# Patient Record
Sex: FEMALE | Race: WHITE | NOT HISPANIC OR LATINO
[De-identification: names, ages, dates, MRNs, and addresses within clinical notes are randomized per-mention and may not be internally consistent; named-entity substitution may affect disease eponyms.]

---

## 2017-01-30 ENCOUNTER — RX RENEWAL (OUTPATIENT)
Age: 27
End: 2017-01-30

## 2017-02-06 ENCOUNTER — APPOINTMENT (OUTPATIENT)
Dept: NEUROLOGY | Facility: CLINIC | Age: 27
End: 2017-02-06

## 2017-02-06 VITALS
DIASTOLIC BLOOD PRESSURE: 68 MMHG | SYSTOLIC BLOOD PRESSURE: 110 MMHG | HEIGHT: 64 IN | BODY MASS INDEX: 18.95 KG/M2 | WEIGHT: 111 LBS | HEART RATE: 78 BPM

## 2017-05-12 ENCOUNTER — APPOINTMENT (OUTPATIENT)
Dept: DERMATOLOGY | Facility: CLINIC | Age: 27
End: 2017-05-12

## 2017-05-12 VITALS — BODY MASS INDEX: 17.93 KG/M2 | WEIGHT: 105 LBS | HEIGHT: 64 IN

## 2017-05-12 RX ORDER — OFLOXACIN OTIC 3 MG/ML
0.3 SOLUTION AURICULAR (OTIC)
Qty: 10 | Refills: 0 | Status: DISCONTINUED | COMMUNITY
Start: 2017-04-19

## 2017-05-12 RX ORDER — AMOXICILLIN 875 MG/1
875 TABLET, FILM COATED ORAL
Qty: 20 | Refills: 0 | Status: DISCONTINUED | COMMUNITY
Start: 2017-04-19

## 2017-05-12 RX ORDER — ALBUTEROL SULFATE 90 UG/1
108 (90 BASE) AEROSOL, METERED RESPIRATORY (INHALATION)
Qty: 18 | Refills: 0 | Status: ACTIVE | COMMUNITY
Start: 2017-04-03

## 2017-06-05 ENCOUNTER — APPOINTMENT (OUTPATIENT)
Dept: NEUROLOGY | Facility: CLINIC | Age: 27
End: 2017-06-05

## 2017-06-05 VITALS
BODY MASS INDEX: 17.93 KG/M2 | DIASTOLIC BLOOD PRESSURE: 77 MMHG | SYSTOLIC BLOOD PRESSURE: 135 MMHG | HEART RATE: 75 BPM | HEIGHT: 64 IN | WEIGHT: 105 LBS

## 2017-07-19 ENCOUNTER — RX RENEWAL (OUTPATIENT)
Age: 27
End: 2017-07-19

## 2017-08-25 ENCOUNTER — APPOINTMENT (OUTPATIENT)
Dept: DERMATOLOGY | Facility: CLINIC | Age: 27
End: 2017-08-25
Payer: MEDICAID

## 2017-08-25 VITALS — WEIGHT: 105 LBS | HEIGHT: 64 IN | BODY MASS INDEX: 17.93 KG/M2

## 2017-08-25 PROCEDURE — 99213 OFFICE O/P EST LOW 20 MIN: CPT

## 2017-12-05 ENCOUNTER — APPOINTMENT (OUTPATIENT)
Dept: NEUROLOGY | Facility: CLINIC | Age: 27
End: 2017-12-05
Payer: MEDICAID

## 2017-12-05 VITALS
HEIGHT: 64 IN | SYSTOLIC BLOOD PRESSURE: 126 MMHG | DIASTOLIC BLOOD PRESSURE: 83 MMHG | BODY MASS INDEX: 18.1 KG/M2 | HEART RATE: 79 BPM | WEIGHT: 106 LBS

## 2017-12-05 PROCEDURE — 99214 OFFICE O/P EST MOD 30 MIN: CPT

## 2018-01-23 ENCOUNTER — RX RENEWAL (OUTPATIENT)
Age: 28
End: 2018-01-23

## 2018-02-13 ENCOUNTER — RX RENEWAL (OUTPATIENT)
Age: 28
End: 2018-02-13

## 2018-02-22 ENCOUNTER — RX RENEWAL (OUTPATIENT)
Age: 28
End: 2018-02-22

## 2018-03-16 ENCOUNTER — APPOINTMENT (OUTPATIENT)
Dept: DERMATOLOGY | Facility: CLINIC | Age: 28
End: 2018-03-16

## 2018-03-23 ENCOUNTER — RX RENEWAL (OUTPATIENT)
Age: 28
End: 2018-03-23

## 2018-05-17 ENCOUNTER — RX RENEWAL (OUTPATIENT)
Age: 28
End: 2018-05-17

## 2018-05-29 ENCOUNTER — APPOINTMENT (OUTPATIENT)
Dept: NEUROLOGY | Facility: CLINIC | Age: 28
End: 2018-05-29
Payer: MEDICAID

## 2018-05-29 VITALS
WEIGHT: 118 LBS | DIASTOLIC BLOOD PRESSURE: 84 MMHG | BODY MASS INDEX: 20.14 KG/M2 | HEIGHT: 64 IN | SYSTOLIC BLOOD PRESSURE: 119 MMHG | HEART RATE: 70 BPM

## 2018-05-29 PROCEDURE — 99214 OFFICE O/P EST MOD 30 MIN: CPT

## 2018-06-15 ENCOUNTER — APPOINTMENT (OUTPATIENT)
Dept: DERMATOLOGY | Facility: CLINIC | Age: 28
End: 2018-06-15

## 2018-06-18 ENCOUNTER — RX RENEWAL (OUTPATIENT)
Age: 28
End: 2018-06-18

## 2018-06-25 ENCOUNTER — RX RENEWAL (OUTPATIENT)
Age: 28
End: 2018-06-25

## 2018-06-28 ENCOUNTER — APPOINTMENT (OUTPATIENT)
Dept: FAMILY MEDICINE | Facility: CLINIC | Age: 28
End: 2018-06-28
Payer: MEDICAID

## 2018-06-28 VITALS
HEIGHT: 64 IN | DIASTOLIC BLOOD PRESSURE: 70 MMHG | RESPIRATION RATE: 12 BRPM | HEART RATE: 70 BPM | BODY MASS INDEX: 19.97 KG/M2 | SYSTOLIC BLOOD PRESSURE: 110 MMHG | WEIGHT: 117 LBS | OXYGEN SATURATION: 97 %

## 2018-06-28 DIAGNOSIS — G40.909 EPILEPSY, UNSPECIFIED, NOT INTRACTABLE, W/OUT STATUS EPILEPTICUS: ICD-10-CM

## 2018-06-28 DIAGNOSIS — H57.8 OTHER SPECIFIED DISORDERS OF EYE AND ADNEXA: ICD-10-CM

## 2018-06-28 DIAGNOSIS — Z80.0 FAMILY HISTORY OF MALIGNANT NEOPLASM OF DIGESTIVE ORGANS: ICD-10-CM

## 2018-06-28 DIAGNOSIS — M79.1 MYALGIA: ICD-10-CM

## 2018-06-28 DIAGNOSIS — Z83.518 FAMILY HISTORY OF OTHER SPECIFIED EYE DISORDER: ICD-10-CM

## 2018-06-28 DIAGNOSIS — J45.909 UNSPECIFIED ASTHMA, UNCOMPLICATED: ICD-10-CM

## 2018-06-28 DIAGNOSIS — Z91.89 OTHER SPECIFIED PERSONAL RISK FACTORS, NOT ELSEWHERE CLASSIFIED: ICD-10-CM

## 2018-06-28 DIAGNOSIS — Z83.3 FAMILY HISTORY OF DIABETES MELLITUS: ICD-10-CM

## 2018-06-28 DIAGNOSIS — F17.200 NICOTINE DEPENDENCE, UNSPECIFIED, UNCOMPLICATED: ICD-10-CM

## 2018-06-28 DIAGNOSIS — Z87.898 PERSONAL HISTORY OF OTHER SPECIFIED CONDITIONS: ICD-10-CM

## 2018-06-28 DIAGNOSIS — F41.9 ANXIETY DISORDER, UNSPECIFIED: ICD-10-CM

## 2018-06-28 DIAGNOSIS — Z87.448 PERSONAL HISTORY OF OTHER DISEASES OF URINARY SYSTEM: ICD-10-CM

## 2018-06-28 DIAGNOSIS — G89.4 CHRONIC PAIN SYNDROME: ICD-10-CM

## 2018-06-28 DIAGNOSIS — Z76.89 PERSONS ENCOUNTERING HEALTH SERVICES IN OTHER SPECIFIED CIRCUMSTANCES: ICD-10-CM

## 2018-06-28 DIAGNOSIS — Z91.09 OTHER ALLERGY STATUS, OTHER THAN TO DRUGS AND BIOLOGICAL SUBSTANCES: ICD-10-CM

## 2018-06-28 DIAGNOSIS — F32.9 MAJOR DEPRESSIVE DISORDER, SINGLE EPISODE, UNSPECIFIED: ICD-10-CM

## 2018-06-28 DIAGNOSIS — Z87.442 PERSONAL HISTORY OF URINARY CALCULI: ICD-10-CM

## 2018-06-28 LAB — CYTOLOGY CVX/VAG DOC THIN PREP: NORMAL

## 2018-06-28 PROCEDURE — 99406 BEHAV CHNG SMOKING 3-10 MIN: CPT

## 2018-06-28 PROCEDURE — G0444 DEPRESSION SCREEN ANNUAL: CPT

## 2018-06-28 PROCEDURE — 99204 OFFICE O/P NEW MOD 45 MIN: CPT | Mod: 25

## 2018-06-28 RX ORDER — TRETINOIN 0.5 MG/G
0.05 CREAM TOPICAL DAILY
Qty: 1 | Refills: 3 | Status: DISCONTINUED | COMMUNITY
Start: 2017-05-12 | End: 2018-06-28

## 2018-06-28 RX ORDER — ALBUTEROL SULFATE 2.5 MG/3ML
(2.5 MG/3ML) SOLUTION RESPIRATORY (INHALATION)
Qty: 75 | Refills: 0 | Status: COMPLETED | COMMUNITY
Start: 2017-04-19 | End: 2018-06-28

## 2018-06-28 NOTE — HEALTH RISK ASSESSMENT
[Good] : ~his/her~ current health as good [Fair] :  ~his/her~ mood as fair [] : Yes [No falls in past year] : Patient reported no falls in the past year [2] : 1) Little interest or pleasure doing things for more than half of the days (2) [1] : 2) Feeling down, depressed, or hopeless for several days (1) [Patient reported PAP Smear was normal] : Patient reported PAP Smear was normal [None] : None [With Family] : lives with family [Employed] : employed [High School] : high school [Significant Other] : lives with significant other [Sexually Active] : sexually active [Feels Safe at Home] : Feels safe at home [Fully functional (bathing, dressing, toileting, transferring, walking, feeding)] : Fully functional (bathing, dressing, toileting, transferring, walking, feeding) [Fully functional (using the telephone, shopping, preparing meals, housekeeping, doing laundry, using] : Fully functional and needs no help or supervision to perform IADLs (using the telephone, shopping, preparing meals, housekeeping, doing laundry, using transportation, managing medications and managing finances) [Smoke Detector] : smoke detector [Carbon Monoxide Detector] : carbon monoxide detector [Seat Belt] :  uses seat belt [Patient declined discussion] : Patient declined discussion [de-identified] : 1ppd - 14 years  [de-identified] : occasional [de-identified] : marijuana  [[id number]] : 3 [[id number]] : 13 [Change in mental status noted] : No change in mental status noted [Language] : denies difficulty with language [Behavior] : denies difficulty with behavior [Reasoning] : denies difficulty with reasoning [Reports changes in hearing] : Reports no changes in hearing [Reports changes in vision] : Reports no changes in vision [Reports changes in dental health] : Reports no changes in dental health [Sunscreen] : does not use sunscreen [PapSmearDate] : 4/2017 [FreeTextEntry2] : part time job [de-identified] : does not use condoms, 1 partner [de-identified] : glasses

## 2018-06-28 NOTE — PHYSICAL EXAM
[No Acute Distress] : no acute distress [Well Nourished] : well nourished [Well Developed] : well developed [Well-Appearing] : well-appearing [PERRL] : pupils equal round and reactive to light [EOMI] : extraocular movements intact [Normal Outer Ear/Nose] : the outer ears and nose were normal in appearance [Normal Oropharynx] : the oropharynx was normal [Normal TMs] : both tympanic membranes were normal [Supple] : supple [No Lymphadenopathy] : no lymphadenopathy [Thyroid Normal, No Nodules] : the thyroid was normal and there were no nodules present [No Respiratory Distress] : no respiratory distress  [Clear to Auscultation] : lungs were clear to auscultation bilaterally [No Accessory Muscle Use] : no accessory muscle use [Normal Rate] : normal rate  [Regular Rhythm] : with a regular rhythm [Normal S1, S2] : normal S1 and S2 [No Murmur] : no murmur heard [Pedal Pulses Present] : the pedal pulses are present [No Edema] : there was no peripheral edema [Soft] : abdomen soft [Non Tender] : non-tender [Non-distended] : non-distended [No Masses] : no abdominal mass palpated [Normal Bowel Sounds] : normal bowel sounds [Normal Posterior Cervical Nodes] : no posterior cervical lymphadenopathy [Normal Anterior Cervical Nodes] : no anterior cervical lymphadenopathy [No CVA Tenderness] : no CVA  tenderness [No Spinal Tenderness] : no spinal tenderness [No Joint Swelling] : no joint swelling [Grossly Normal Strength/Tone] : grossly normal strength/tone [No Rash] : no rash [Normal Gait] : normal gait [Coordination Grossly Intact] : coordination grossly intact [No Focal Deficits] : no focal deficits [Normal Affect] : the affect was normal [Alert and Oriented x3] : oriented to person, place, and time [Normal Insight/Judgement] : insight and judgment were intact [de-identified] : eye redness bilaterally, no crusting or discharge

## 2018-06-28 NOTE — COUNSELING
[Healthy eating counseling provided] : healthy eating [Activity counseling provided] : activity [Smoking cessation counseling provided] : smoking cessation [Quit Smoking] : Quit smoking [Needs reinforcement, provided] : Patient needs reinforcement on understanding lifestyle changes and  the steps needed to achieve self management goals and reinforcement was provided [Tobacco Use Cessation Intermediate Greater Than 3 Minutes Up to 10 Minutes] : Tobacco Use Cessation Intermediate Greater Than 3 Minutes Up to 10 Minutes [ - Annual Depression Screening] : Annual Depression Screening

## 2018-06-28 NOTE — ASSESSMENT
[FreeTextEntry1] : Establish care\par - patient will schedule a physical exam and blood work to be done\par - will obtain records from previous PCP\par - due for pap - info for GYN given\par - declines tetanus vaccine\par - discussed the importance of skin care and sunscreen use, discouraged patient from using tanning beds - follow up with derm for skin check\par \par Anxiety/depression\par - positive screening - PHQ 9 score 13\par - currently on zoloft and xanax\par - has never seen psychiatry before\par - recommend follow up with psychiatry to improve medication management\par \par Seizure disorder\par -  follows with neuro Dr. Brand\par - currently on Lamictal \par - has been seizure free for over a year\par \par Eye redness/ irritation\par - ongoing, seasonal\par - likely allergies\par - use antihistamines and eye drops\par - referral given for optho \par - avoid eye make up and contacts at this time\par \par Current smoker / cessation counseling\par - spent more than 5 minutes discussing current smoking habits and options for cessation\par - patient has thought about quitting, not ready yet\par - unable to use wellbutrin due to seizure hx, is scared to try other meds\par - patch does not help\par - offered info for [facility] - will consider\par

## 2018-06-28 NOTE — PAST MEDICAL HISTORY
[Menstruating] : menstruating [Approximately ___] : the LMP was approximately [unfilled] [Regular Cycle Intervals] : have been regular [Total Preg ___] : G[unfilled] [Abortions ___] : Abortions:[unfilled] [AB Induced ___] : elective abortions: [unfilled]

## 2018-06-28 NOTE — HISTORY OF PRESENT ILLNESS
[FreeTextEntry1] : establish care [de-identified] : Patient is here today to establish care.  \par She has hx of seizure disorder which has now been well controlled with lamictal. \par She also has anxiety/ depression which she feels is getting worse these past few weeks since coming back from [place].  SHe has less drive/ motivation to do things.  She is taking zoloft and xanax. \par She also has severe eye irritation on and off, seasonally.  She saw an optometrist a few years ago that told her it was allergies and she was using drops.  THey help sometimes.  She normally does not have the problem when she is in [place].

## 2018-07-22 ENCOUNTER — RX RENEWAL (OUTPATIENT)
Age: 28
End: 2018-07-22

## 2018-07-23 ENCOUNTER — RX RENEWAL (OUTPATIENT)
Age: 28
End: 2018-07-23

## 2018-09-19 ENCOUNTER — MEDICATION RENEWAL (OUTPATIENT)
Age: 28
End: 2018-09-19

## 2018-09-28 ENCOUNTER — APPOINTMENT (OUTPATIENT)
Dept: DERMATOLOGY | Facility: CLINIC | Age: 28
End: 2018-09-28
Payer: MEDICAID

## 2018-09-28 VITALS — WEIGHT: 120 LBS | BODY MASS INDEX: 20.49 KG/M2 | HEIGHT: 64 IN

## 2018-09-28 PROCEDURE — 99213 OFFICE O/P EST LOW 20 MIN: CPT

## 2018-10-15 ENCOUNTER — RX RENEWAL (OUTPATIENT)
Age: 28
End: 2018-10-15

## 2018-10-15 ENCOUNTER — MED ADMIN CHARGE (OUTPATIENT)
Age: 28
End: 2018-10-15

## 2018-11-06 ENCOUNTER — APPOINTMENT (OUTPATIENT)
Dept: NEUROLOGY | Facility: CLINIC | Age: 28
End: 2018-11-06
Payer: MEDICAID

## 2018-11-06 VITALS
HEIGHT: 64 IN | HEART RATE: 77 BPM | DIASTOLIC BLOOD PRESSURE: 76 MMHG | SYSTOLIC BLOOD PRESSURE: 118 MMHG | BODY MASS INDEX: 20.49 KG/M2 | WEIGHT: 120 LBS

## 2018-11-06 PROCEDURE — 99215 OFFICE O/P EST HI 40 MIN: CPT

## 2018-11-12 ENCOUNTER — RX RENEWAL (OUTPATIENT)
Age: 28
End: 2018-11-12

## 2018-12-10 ENCOUNTER — MEDICATION RENEWAL (OUTPATIENT)
Age: 28
End: 2018-12-10

## 2019-01-03 ENCOUNTER — MEDICATION RENEWAL (OUTPATIENT)
Age: 29
End: 2019-01-03

## 2019-02-02 ENCOUNTER — RX RENEWAL (OUTPATIENT)
Age: 29
End: 2019-02-02

## 2019-02-02 ENCOUNTER — MEDICATION RENEWAL (OUTPATIENT)
Age: 29
End: 2019-02-02

## 2019-03-06 ENCOUNTER — RX RENEWAL (OUTPATIENT)
Age: 29
End: 2019-03-06

## 2019-03-08 ENCOUNTER — APPOINTMENT (OUTPATIENT)
Dept: DERMATOLOGY | Facility: CLINIC | Age: 29
End: 2019-03-08

## 2019-03-11 ENCOUNTER — RX RENEWAL (OUTPATIENT)
Age: 29
End: 2019-03-11

## 2019-04-10 ENCOUNTER — MEDICATION RENEWAL (OUTPATIENT)
Age: 29
End: 2019-04-10

## 2019-05-16 ENCOUNTER — MEDICATION RENEWAL (OUTPATIENT)
Age: 29
End: 2019-05-16

## 2019-06-20 ENCOUNTER — MEDICATION RENEWAL (OUTPATIENT)
Age: 29
End: 2019-06-20

## 2019-07-15 ENCOUNTER — RX RENEWAL (OUTPATIENT)
Age: 29
End: 2019-07-15

## 2019-07-26 ENCOUNTER — MEDICATION RENEWAL (OUTPATIENT)
Age: 29
End: 2019-07-26

## 2019-07-26 ENCOUNTER — RX RENEWAL (OUTPATIENT)
Age: 29
End: 2019-07-26

## 2019-08-27 ENCOUNTER — RX RENEWAL (OUTPATIENT)
Age: 29
End: 2019-08-27

## 2019-09-09 ENCOUNTER — RX RENEWAL (OUTPATIENT)
Age: 29
End: 2019-09-09

## 2019-09-13 ENCOUNTER — APPOINTMENT (OUTPATIENT)
Dept: NEUROLOGY | Facility: CLINIC | Age: 29
End: 2019-09-13

## 2019-10-03 ENCOUNTER — MEDICATION RENEWAL (OUTPATIENT)
Age: 29
End: 2019-10-03

## 2019-10-03 ENCOUNTER — RX RENEWAL (OUTPATIENT)
Age: 29
End: 2019-10-03

## 2019-10-07 ENCOUNTER — RX RENEWAL (OUTPATIENT)
Age: 29
End: 2019-10-07

## 2019-11-07 ENCOUNTER — RX RENEWAL (OUTPATIENT)
Age: 29
End: 2019-11-07

## 2019-11-19 ENCOUNTER — APPOINTMENT (OUTPATIENT)
Dept: NEUROLOGY | Facility: CLINIC | Age: 29
End: 2019-11-19

## 2019-11-25 ENCOUNTER — RX RENEWAL (OUTPATIENT)
Age: 29
End: 2019-11-25

## 2019-12-06 ENCOUNTER — RX RENEWAL (OUTPATIENT)
Age: 29
End: 2019-12-06

## 2019-12-13 ENCOUNTER — APPOINTMENT (OUTPATIENT)
Dept: DERMATOLOGY | Facility: CLINIC | Age: 29
End: 2019-12-13
Payer: MEDICAID

## 2019-12-13 PROCEDURE — 99213 OFFICE O/P EST LOW 20 MIN: CPT

## 2019-12-13 NOTE — HISTORY OF PRESENT ILLNESS
[de-identified] : Pt. for check of face;  on spirono for acne;  doing well; \par also:  recent rashes on lips;  now improved; was worse over summer;\par

## 2019-12-13 NOTE — ASSESSMENT
[FreeTextEntry1] : Acne doing very well on spirono at 50/d;  continue txs;  also using OTC system with good results\par \par ? ACD of face;  works in nail salon;  now improved, The patient will consider patch testing if this condition continues to recur

## 2019-12-20 ENCOUNTER — APPOINTMENT (OUTPATIENT)
Dept: NEUROLOGY | Facility: CLINIC | Age: 29
End: 2019-12-20
Payer: MEDICAID

## 2019-12-20 VITALS
HEIGHT: 64 IN | SYSTOLIC BLOOD PRESSURE: 127 MMHG | WEIGHT: 125 LBS | DIASTOLIC BLOOD PRESSURE: 77 MMHG | HEART RATE: 71 BPM | BODY MASS INDEX: 21.34 KG/M2

## 2019-12-20 PROCEDURE — 99214 OFFICE O/P EST MOD 30 MIN: CPT

## 2019-12-22 ENCOUNTER — RX RENEWAL (OUTPATIENT)
Age: 29
End: 2019-12-22

## 2019-12-24 NOTE — HISTORY OF PRESENT ILLNESS
[FreeTextEntry1] : Ms. Victoria is a 29 year old right handed woman with a history of likely febrile seizures who comes to the office today for f/u for her seizure d/o.\par \par ***UPDATE:12/20/19***\par Ms Rama Victoria is doing well with no reported interval seizures. She has no side effects . She needs to be followed by psychiatry for her anxiety/depression\par \par Lamotrigine 100mg BID\par Zoloft 100mg QHS\par Xanax 2mg TID\par \par \par The patient states that  seizures at the age of 6 months and a second seizure at the age of 1 years (the exact details of the seizure are unknown) that were in the presence of high fevers.  The workup is unknown and the patient was not treated with medications at that time.\par \par The patient had her first adult seizure at the age of 19.  She was living with an Ex-boyfriend at that time and awoke in the middle of the night foaming at her mouth and having full body stiffness.  This was followed by nearly an hour of confusion.  She was taken to the local ED where full workup was negative for any pathology (full details unknown).  \par \par Since her first event, she has had similar events every month to few months (although she has been known to have clusters of 2 seizures in a week rarely).  The episodes are described as full body stiffening without any focality.  They last 30 seconds to three minutes and have accompanying tongue biting (usually left posterior lateral tongue) and are followed by a post-ictal state lasting from a half-hour to  a full day.  They can occur during both day and nighttime hours and are more frequent with sleep deprivation. \par \par The patient was originally prescribed Topamax that was stopped because it did not control the events.  She does not know the maximum dose.  The patient was prescribed Keppra in the past, but this was stopped secondary to agitation and aggression.  Also made her feel "psychotic" and she had events even when on a good dose of the medication.  Depakote was prescribed in May 2013, but it made her feel sick and was stopped. 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  Started on ZNS and LTG.  ZNS gave her severe depression and was stopped.  Was on LTG 200mg bid after slow increase.  \par \par We started LTG 100mg bid (after increase) and mood better (weaned herself off beginning 2017).   Also on Zoloft 75mg qhs.  Still very depressed and has no psychiatrist or .  \par \par PMHx: As above\par PSHx: none\par SocHx: smokes 1/2PPD, social etoh, smoke MJ, not working at this time, not driving\par FHx: grandfather with epilepsy (does not know details)\par Meds: none\par All: ?NSAIDs

## 2019-12-24 NOTE — PHYSICAL EXAM
[Oriented To Time, Place, And Person] : oriented to person, place, and time [General Appearance - In No Acute Distress] : in no acute distress [General Appearance - Alert] : alert [Impaired Insight] : insight and judgment were intact [Affect] : the affect was normal [Place] : oriented to place [Person] : oriented to person [Short Term Intact] : short term memory intact [Time] : oriented to time [Registration Intact] : recent registration memory intact [Remote Intact] : remote memory intact [Visual Intact] : visual attention was ~T not ~L decreased [Span Intact] : the attention span was normal [Concentration Intact] : normal concentrating ability [Repeating Phrases] : no difficulty repeating a phrase [Naming Objects] : no difficulty naming common objects [Writing A Sentence] : no difficulty writing a sentence [Fluency] : fluency intact [Comprehension] : comprehension intact [Reading] : reading intact [Current Events] : adequate knowledge of current events [Past History] : adequate knowledge of personal past history [Vocabulary] : adequate range of vocabulary [Cranial Nerves Optic (II)] : visual acuity intact bilaterally,  visual fields full to confrontation, pupils equal round and reactive to light [Cranial Nerves Trigeminal (V)] : facial sensation intact symmetrically [Cranial Nerves Oculomotor (III)] : extraocular motion intact [Cranial Nerves Facial (VII)] : face symmetrical [Cranial Nerves Glossopharyngeal (IX)] : tongue and palate midline [Cranial Nerves Vestibulocochlear (VIII)] : hearing was intact bilaterally [Cranial Nerves Hypoglossal (XII)] : there was no tongue deviation with protrusion [Cranial Nerves Accessory (XI - Cranial And Spinal)] : head turning and shoulder shrug symmetric [Motor Strength] : muscle strength was normal in all four extremities [No Muscle Atrophy] : normal bulk in all four extremities [Sensation Tactile Decrease] : light touch was intact [Sensation Pain / Temperature Decrease] : pain and temperature was intact [Sensation Vibration Decrease] : vibration was intact [Proprioception] : proprioception was intact [Abnormal Walk] : normal gait [Balance] : balance was intact [2+] : Ankle jerk left 2+ [Sclera] : the sclera and conjunctiva were normal [PERRL With Normal Accommodation] : pupils were equal in size, round, reactive to light, with normal accommodation [Neck Appearance] : the appearance of the neck was normal [Extraocular Movements] : extraocular movements were intact [Neck Cervical Mass (___cm)] : no neck mass was observed [Jugular Venous Distention Increased] : there was no jugular-venous distention [Thyroid Diffuse Enlargement] : the thyroid was not enlarged [Thyroid Nodule] : there were no palpable thyroid nodules [Full Pulse] : the pedal pulses are present [Edema] : there was no peripheral edema [Nail Clubbing] : no clubbing  or cyanosis of the fingernails [Musculoskeletal - Swelling] : no joint swelling seen [Motor Tone] : muscle strength and tone were normal [Past-pointing] : there was no past-pointing [Tremor] : no tremor present [Plantar Reflex Right Only] : normal on the right [Plantar Reflex Left Only] : normal on the left [FreeTextEntry6] : On crutches 2/2 ankle issues [FreeTextEntry1] : On crutches

## 2019-12-24 NOTE — DISCUSSION/SUMMARY
[FreeTextEntry1] : Ms. Victoria is a 29 year old woman with a history of IGE as confirmed by VEEG.\par \par The patient has been seizure free for over one year and there is no driving restriction.\par She also knows not to swim or bathe without supervision or stand at high places.\par She was once again warned of the dangers of not taking her prescribed Xanax with seizure risk.\par \par Plan\par continue Lamotrigine 100mg BID\par Annual labwork\par reviewed seizure triggers\par follow up in 4- 6 months\par

## 2020-01-03 ENCOUNTER — MEDICATION RENEWAL (OUTPATIENT)
Age: 30
End: 2020-01-03

## 2020-02-25 ENCOUNTER — RX RENEWAL (OUTPATIENT)
Age: 30
End: 2020-02-25

## 2020-04-26 ENCOUNTER — RX RENEWAL (OUTPATIENT)
Age: 30
End: 2020-04-26

## 2020-06-25 ENCOUNTER — RX RENEWAL (OUTPATIENT)
Age: 30
End: 2020-06-25

## 2020-06-26 ENCOUNTER — RX RENEWAL (OUTPATIENT)
Age: 30
End: 2020-06-26

## 2020-08-07 ENCOUNTER — APPOINTMENT (OUTPATIENT)
Dept: NEUROLOGY | Facility: CLINIC | Age: 30
End: 2020-08-07
Payer: MEDICAID

## 2020-08-07 VITALS
HEART RATE: 92 BPM | BODY MASS INDEX: 19.97 KG/M2 | WEIGHT: 117 LBS | SYSTOLIC BLOOD PRESSURE: 125 MMHG | DIASTOLIC BLOOD PRESSURE: 84 MMHG | HEIGHT: 64 IN

## 2020-08-07 VITALS — TEMPERATURE: 97.6 F

## 2020-08-07 PROCEDURE — 99214 OFFICE O/P EST MOD 30 MIN: CPT

## 2020-08-07 NOTE — PHYSICAL EXAM
[Oriented To Time, Place, And Person] : oriented to person, place, and time [General Appearance - Alert] : alert [General Appearance - In No Acute Distress] : in no acute distress [Impaired Insight] : insight and judgment were intact [Affect] : the affect was normal [Person] : oriented to person [Short Term Intact] : short term memory intact [Place] : oriented to place [Time] : oriented to time [Registration Intact] : recent registration memory intact [Remote Intact] : remote memory intact [Concentration Intact] : normal concentrating ability [Span Intact] : the attention span was normal [Visual Intact] : visual attention was ~T not ~L decreased [Repeating Phrases] : no difficulty repeating a phrase [Naming Objects] : no difficulty naming common objects [Fluency] : fluency intact [Writing A Sentence] : no difficulty writing a sentence [Comprehension] : comprehension intact [Reading] : reading intact [Current Events] : adequate knowledge of current events [Past History] : adequate knowledge of personal past history [Vocabulary] : adequate range of vocabulary [Cranial Nerves Oculomotor (III)] : extraocular motion intact [Cranial Nerves Trigeminal (V)] : facial sensation intact symmetrically [Cranial Nerves Optic (II)] : visual acuity intact bilaterally,  visual fields full to confrontation, pupils equal round and reactive to light [Cranial Nerves Vestibulocochlear (VIII)] : hearing was intact bilaterally [Cranial Nerves Facial (VII)] : face symmetrical [Cranial Nerves Hypoglossal (XII)] : there was no tongue deviation with protrusion [Cranial Nerves Accessory (XI - Cranial And Spinal)] : head turning and shoulder shrug symmetric [Cranial Nerves Glossopharyngeal (IX)] : tongue and palate midline [Motor Strength] : muscle strength was normal in all four extremities [No Muscle Atrophy] : normal bulk in all four extremities [Sensation Pain / Temperature Decrease] : pain and temperature was intact [Sensation Tactile Decrease] : light touch was intact [Proprioception] : proprioception was intact [Sensation Vibration Decrease] : vibration was intact [Abnormal Walk] : normal gait [Balance] : balance was intact [2+] : Ankle jerk left 2+ [PERRL With Normal Accommodation] : pupils were equal in size, round, reactive to light, with normal accommodation [Extraocular Movements] : extraocular movements were intact [Sclera] : the sclera and conjunctiva were normal [Jugular Venous Distention Increased] : there was no jugular-venous distention [Neck Appearance] : the appearance of the neck was normal [Neck Cervical Mass (___cm)] : no neck mass was observed [Thyroid Diffuse Enlargement] : the thyroid was not enlarged [Full Pulse] : the pedal pulses are present [Thyroid Nodule] : there were no palpable thyroid nodules [Nail Clubbing] : no clubbing  or cyanosis of the fingernails [Musculoskeletal - Swelling] : no joint swelling seen [Edema] : there was no peripheral edema [Motor Tone] : muscle strength and tone were normal [Past-pointing] : there was no past-pointing [Tremor] : no tremor present [Plantar Reflex Right Only] : normal on the right [FreeTextEntry6] : On crutches 2/2 ankle issues [Plantar Reflex Left Only] : normal on the left [FreeTextEntry1] : On crutches

## 2020-08-07 NOTE — HISTORY OF PRESENT ILLNESS
[FreeTextEntry1] : Ms. Victoria is a 29 year old right handed woman with a history of likely febrile seizures who comes to the office today for f/u for her seizure d/o.\par \par \par ***UPDATE:8/7/20***\par Ms Rama Victoria is doing well with no reported interval seizures. She has no side effects . She is in the process of looking for psychiatry for her anxiety/depression\par \par \par ***UPDATE:12/20/19***\par Ms Rama Victoria is doing well with no reported interval seizures. She has no side effects . She needs to be followed by psychiatry for her anxiety/depression\par \par Lamotrigine 100mg BID\par Zoloft 100mg QHS\par Xanax 2mg TID\par \par \par The patient states that  seizures at the age of 6 months and a second seizure at the age of 1 years (the exact details of the seizure are unknown) that were in the presence of high fevers.  The workup is unknown and the patient was not treated with medications at that time.\par \par The patient had her first adult seizure at the age of 19.  She was living with an Ex-boyfriend at that time and awoke in the middle of the night foaming at her mouth and having full body stiffness.  This was followed by nearly an hour of confusion.  She was taken to the local ED where full workup was negative for any pathology (full details unknown).  \par \par Since her first event, she has had similar events every month to few months (although she has been known to have clusters of 2 seizures in a week rarely).  The episodes are described as full body stiffening without any focality.  They last 30 seconds to three minutes and have accompanying tongue biting (usually left posterior lateral tongue) and are followed by a post-ictal state lasting from a half-hour to  a full day.  They can occur during both day and nighttime hours and are more frequent with sleep deprivation. \par \par The patient was originally prescribed Topamax that was stopped because it did not control the events.  She does not know the maximum dose.  The patient was prescribed Keppra in the past, but this was stopped secondary to agitation and aggression.  Also made her feel "psychotic" and she had events even when on a good dose of the medication.  Depakote was prescribed in May 2013, but it made her feel sick and was stopped. 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  Started on ZNS and LTG.  ZNS gave her severe depression and was stopped.  Was on LTG 200mg bid after slow increase.  \par \par We started LTG 100mg bid (after increase) and mood better (weaned herself off beginning 2017).   Also on Zoloft 75mg qhs.  Still very depressed and has no psychiatrist or .  \par \par PMHx: As above\par PSHx: none\par SocHx: smokes 1/2PPD, social etoh, smoke MJ, not working at this time, not driving\par FHx: grandfather with epilepsy (does not know details)\par Meds: none\par All: ?NSAIDs

## 2020-08-26 ENCOUNTER — RX RENEWAL (OUTPATIENT)
Age: 30
End: 2020-08-26

## 2020-10-04 ENCOUNTER — RX RENEWAL (OUTPATIENT)
Age: 30
End: 2020-10-04

## 2020-10-05 ENCOUNTER — RX RENEWAL (OUTPATIENT)
Age: 30
End: 2020-10-05

## 2020-10-24 ENCOUNTER — RX RENEWAL (OUTPATIENT)
Age: 30
End: 2020-10-24

## 2020-11-06 ENCOUNTER — RX RENEWAL (OUTPATIENT)
Age: 30
End: 2020-11-06

## 2021-01-28 ENCOUNTER — RX RENEWAL (OUTPATIENT)
Age: 31
End: 2021-01-28

## 2021-02-17 ENCOUNTER — RX RENEWAL (OUTPATIENT)
Age: 31
End: 2021-02-17

## 2021-03-30 ENCOUNTER — RX RENEWAL (OUTPATIENT)
Age: 31
End: 2021-03-30

## 2021-04-28 ENCOUNTER — RX RENEWAL (OUTPATIENT)
Age: 31
End: 2021-04-28

## 2021-05-03 ENCOUNTER — RX RENEWAL (OUTPATIENT)
Age: 31
End: 2021-05-03

## 2021-05-10 ENCOUNTER — APPOINTMENT (OUTPATIENT)
Dept: DERMATOLOGY | Facility: CLINIC | Age: 31
End: 2021-05-10
Payer: MEDICAID

## 2021-05-10 PROCEDURE — 99214 OFFICE O/P EST MOD 30 MIN: CPT

## 2021-05-10 PROCEDURE — 99072 ADDL SUPL MATRL&STAF TM PHE: CPT

## 2021-05-10 NOTE — ASSESSMENT
[FreeTextEntry1] : Acne;  flaring recently, significant closed comedones; \par \par Therapeutic options and their risks and benefits; along with multiple diagnostic possibilities were discussed at length; risks and benefits of further study were discussed;\par \par increase spirono to 100/d;  tolerating well; \par \par add tretinoin 0.05 HS;  \par \par f/u 2 mos;  t/c acne surgery;  \par may f/u after summer;  works in [employer]

## 2021-05-23 ENCOUNTER — RX RENEWAL (OUTPATIENT)
Age: 31
End: 2021-05-23

## 2021-05-27 ENCOUNTER — RX RENEWAL (OUTPATIENT)
Age: 31
End: 2021-05-27

## 2021-06-25 ENCOUNTER — RX RENEWAL (OUTPATIENT)
Age: 31
End: 2021-06-25

## 2021-07-19 ENCOUNTER — RX RENEWAL (OUTPATIENT)
Age: 31
End: 2021-07-19

## 2021-08-15 ENCOUNTER — RX RENEWAL (OUTPATIENT)
Age: 31
End: 2021-08-15

## 2021-08-23 ENCOUNTER — APPOINTMENT (OUTPATIENT)
Dept: NEUROLOGY | Facility: CLINIC | Age: 31
End: 2021-08-23

## 2021-09-12 ENCOUNTER — RX RENEWAL (OUTPATIENT)
Age: 31
End: 2021-09-12

## 2021-10-13 ENCOUNTER — RX RENEWAL (OUTPATIENT)
Age: 31
End: 2021-10-13

## 2021-11-05 ENCOUNTER — APPOINTMENT (OUTPATIENT)
Dept: NEUROLOGY | Facility: CLINIC | Age: 31
End: 2021-11-05
Payer: MEDICAID

## 2021-11-05 VITALS
BODY MASS INDEX: 20.49 KG/M2 | HEIGHT: 64 IN | DIASTOLIC BLOOD PRESSURE: 85 MMHG | HEART RATE: 124 BPM | WEIGHT: 120 LBS | SYSTOLIC BLOOD PRESSURE: 135 MMHG

## 2021-11-05 PROCEDURE — 99214 OFFICE O/P EST MOD 30 MIN: CPT

## 2021-11-05 NOTE — HISTORY OF PRESENT ILLNESS
[FreeTextEntry1] : Ms. Victoria is a 31 year old right handed woman with a history of likely febrile seizures who comes to the office today for f/u for her seizure d/o.\par \par \par ***UPDATE:11/5/21***\par Ms Rama Victoria is doing well with no reported interval seizures. She has no side effects . She is in the process of looking for psychiatry for her anxiety/depression\par Still with anxiety and would like to increase Zoloft.\par \par ***UPDATE:8/7/20***\par Ms Rama Victoria is doing well with no reported interval seizures. She has no side effects . She is in the process of looking for psychiatry for her anxiety/depression\par \par \par ***UPDATE:12/20/19***\par Ms Rama Victoria is doing well with no reported interval seizures. She has no side effects . She needs to be followed by psychiatry for her anxiety/depression\par \par Lamotrigine 100mg BID\par Zoloft 100mg QHS\par Xanax 2mg TID\par \par \par The patient states that  seizures at the age of 6 months and a second seizure at the age of 1 years (the exact details of the seizure are unknown) that were in the presence of high fevers.  The workup is unknown and the patient was not treated with medications at that time.\par \par The patient had her first adult seizure at the age of 19.  She was living with an Ex-boyfriend at that time and awoke in the middle of the night foaming at her mouth and having full body stiffness.  This was followed by nearly an hour of confusion.  She was taken to the local ED where full workup was negative for any pathology (full details unknown).  \par \par Since her first event, she has had similar events every month to few months (although she has been known to have clusters of 2 seizures in a week rarely).  The episodes are described as full body stiffening without any focality.  They last 30 seconds to three minutes and have accompanying tongue biting (usually left posterior lateral tongue) and are followed by a post-ictal state lasting from a half-hour to  a full day.  They can occur during both day and nighttime hours and are more frequent with sleep deprivation. \par \par The patient was originally prescribed Topamax that was stopped because it did not control the events.  She does not know the maximum dose.  The patient was prescribed Keppra in the past, but this was stopped secondary to agitation and aggression.  Also made her feel "psychotic" and she had events even when on a good dose of the medication.  Depakote was prescribed in May 2013, but it made her feel sick and was stopped. 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  Started on ZNS and LTG.  ZNS gave her severe depression and was stopped.  Was on LTG 200mg bid after slow increase.  \par \par We started LTG 100mg bid (after increase) and mood better (weaned herself off beginning 2017).   Also on Zoloft 75mg qhs.  Still very depressed and has no psychiatrist or .  \par \par PMHx: As above\par PSHx: none\par SocHx: smokes 1/2PPD, social etoh, smoke MJ, not working at this time, not driving\par FHx: grandfather with epilepsy (does not know details)\par Meds: none\par All: ?NSAIDs

## 2021-11-05 NOTE — PHYSICAL EXAM
[General Appearance - Alert] : alert [General Appearance - In No Acute Distress] : in no acute distress [Oriented To Time, Place, And Person] : oriented to person, place, and time [Impaired Insight] : insight and judgment were intact [Affect] : the affect was normal [Person] : oriented to person [Place] : oriented to place [Time] : oriented to time [Short Term Intact] : short term memory intact [Remote Intact] : remote memory intact [Registration Intact] : recent registration memory intact [Span Intact] : the attention span was normal [Concentration Intact] : normal concentrating ability [Visual Intact] : visual attention was ~T not ~L decreased [Naming Objects] : no difficulty naming common objects [Repeating Phrases] : no difficulty repeating a phrase [Writing A Sentence] : no difficulty writing a sentence [Fluency] : fluency intact [Comprehension] : comprehension intact [Reading] : reading intact [Current Events] : adequate knowledge of current events [Past History] : adequate knowledge of personal past history [Vocabulary] : adequate range of vocabulary [Cranial Nerves Optic (II)] : visual acuity intact bilaterally,  visual fields full to confrontation, pupils equal round and reactive to light [Cranial Nerves Oculomotor (III)] : extraocular motion intact [Cranial Nerves Trigeminal (V)] : facial sensation intact symmetrically [Cranial Nerves Facial (VII)] : face symmetrical [Cranial Nerves Vestibulocochlear (VIII)] : hearing was intact bilaterally [Cranial Nerves Glossopharyngeal (IX)] : tongue and palate midline [Cranial Nerves Accessory (XI - Cranial And Spinal)] : head turning and shoulder shrug symmetric [Cranial Nerves Hypoglossal (XII)] : there was no tongue deviation with protrusion [Motor Strength] : muscle strength was normal in all four extremities [No Muscle Atrophy] : normal bulk in all four extremities [Sensation Tactile Decrease] : light touch was intact [Sensation Pain / Temperature Decrease] : pain and temperature was intact [Sensation Vibration Decrease] : vibration was intact [Proprioception] : proprioception was intact [Abnormal Walk] : normal gait [Balance] : balance was intact [Past-pointing] : there was no past-pointing [Tremor] : no tremor present [2+] : Ankle jerk left 2+ [Plantar Reflex Right Only] : normal on the right [Plantar Reflex Left Only] : normal on the left [FreeTextEntry6] : On crutches 2/2 ankle issues [Sclera] : the sclera and conjunctiva were normal [PERRL With Normal Accommodation] : pupils were equal in size, round, reactive to light, with normal accommodation [Extraocular Movements] : extraocular movements were intact [Neck Appearance] : the appearance of the neck was normal [Neck Cervical Mass (___cm)] : no neck mass was observed [Jugular Venous Distention Increased] : there was no jugular-venous distention [Thyroid Diffuse Enlargement] : the thyroid was not enlarged [Thyroid Nodule] : there were no palpable thyroid nodules [Full Pulse] : the pedal pulses are present [Edema] : there was no peripheral edema [Nail Clubbing] : no clubbing  or cyanosis of the fingernails [Musculoskeletal - Swelling] : no joint swelling seen [Motor Tone] : muscle strength and tone were normal [FreeTextEntry1] : On crutches

## 2021-11-05 NOTE — DISCUSSION/SUMMARY
[FreeTextEntry1] : Ms. 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.\par \par The patient has been seizure free for over one year and there is no driving restriction.\par She also knows not to swim or bathe without supervision or stand at high places.\par She was once again warned of the dangers of not taking her prescribed Xanax with seizure risk.\par \par Plan\par continue Lamotrigine 100mg BID - risks discussed, including teratogenic\par Increase Zoloft 150mg - risks discussed, including teratogenic.\par Annual labwork\par reviewed seizure triggers\par follow up in 4- 6 months\par Continue to look for psych f/u; discussed Xanax risks discussed, including teratogenic\par \par Patient seen 35 min face to face with >50% in counseling and discussion\par

## 2021-11-19 ENCOUNTER — RX RENEWAL (OUTPATIENT)
Age: 31
End: 2021-11-19

## 2021-12-31 ENCOUNTER — RX RENEWAL (OUTPATIENT)
Age: 31
End: 2021-12-31

## 2022-02-03 ENCOUNTER — RX RENEWAL (OUTPATIENT)
Age: 32
End: 2022-02-03

## 2022-03-23 ENCOUNTER — RX RENEWAL (OUTPATIENT)
Age: 32
End: 2022-03-23

## 2022-04-25 ENCOUNTER — APPOINTMENT (OUTPATIENT)
Dept: NEUROLOGY | Facility: CLINIC | Age: 32
End: 2022-04-25

## 2022-04-29 ENCOUNTER — APPOINTMENT (OUTPATIENT)
Dept: NEUROLOGY | Facility: CLINIC | Age: 32
End: 2022-04-29

## 2022-05-25 ENCOUNTER — NON-APPOINTMENT (OUTPATIENT)
Age: 32
End: 2022-05-25

## 2022-05-29 NOTE — DISCUSSION/SUMMARY
[FreeTextEntry1] : Ms. 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

## 2022-06-24 RX ORDER — SPIRONOLACTONE 25 MG/1
25 TABLET ORAL
Qty: 60 | Refills: 3 | Status: DISCONTINUED | COMMUNITY
Start: 2017-03-26 | End: 2022-06-24

## 2022-07-22 ENCOUNTER — APPOINTMENT (OUTPATIENT)
Dept: NEUROLOGY | Facility: CLINIC | Age: 32
End: 2022-07-22

## 2022-07-26 ENCOUNTER — NON-APPOINTMENT (OUTPATIENT)
Age: 32
End: 2022-07-26

## 2022-08-02 ENCOUNTER — APPOINTMENT (OUTPATIENT)
Dept: DERMATOLOGY | Facility: CLINIC | Age: 32
End: 2022-08-02

## 2022-09-22 ENCOUNTER — RX RENEWAL (OUTPATIENT)
Age: 32
End: 2022-09-22

## 2022-10-21 ENCOUNTER — APPOINTMENT (OUTPATIENT)
Dept: NEUROLOGY | Facility: CLINIC | Age: 32
End: 2022-10-21

## 2022-10-21 VITALS
RESPIRATION RATE: 13 BRPM | HEIGHT: 64 IN | WEIGHT: 110 LBS | SYSTOLIC BLOOD PRESSURE: 136 MMHG | HEART RATE: 73 BPM | DIASTOLIC BLOOD PRESSURE: 86 MMHG | BODY MASS INDEX: 18.78 KG/M2

## 2022-10-21 PROCEDURE — 99215 OFFICE O/P EST HI 40 MIN: CPT

## 2022-10-21 NOTE — DISCUSSION/SUMMARY
[FreeTextEntry1] : Ms. 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.\par \par The patient has been seizure free for over one year and there is no driving restriction.\par She also knows not to swim or bathe without supervision or stand at high places.\par She was once again warned of the dangers of not taking her prescribed Xanax with seizure risk.\par \par Plan\par continue Lamotrigine 100mg BID - risks discussed, including teratogenic\par Zoloft 100mg - risks discussed, including teratogenic.(did not increased; doing well working in gym in the  section)\par Annual labwork\par reviewed seizure triggers\par follow up in 4- 6 months\par Continue to look for psych f/u; discussed Xanax risks discussed, including teratogenic\par \par Total time 40 min\par Patient seen 35 min face to face with >50% in counseling and discussion\par

## 2022-10-21 NOTE — HISTORY OF PRESENT ILLNESS
[FreeTextEntry1] : Ms. Victoria is a 32 year old right handed woman with a history of likely febrile seizures who comes to the office today for f/u for her seizure d/o.\par \par ***UPDATE:10/21/22***\par Ms Rama Victoria is doing well with no reported interval seizures. She has no side effects . She is in the process of looking for psychiatry for her anxiety/depression\par \par ***UPDATE:11/5/21***\par Ms Rama Victoria is doing well with no reported interval seizures. She has no side effects . She is in the process of looking for psychiatry for her anxiety/depression\par Still with anxiety and would like to increase Zoloft.\par \par ***UPDATE:8/7/20***\par Ms Rama Victoria is doing well with no reported interval seizures. She has no side effects . She is in the process of looking for psychiatry for her anxiety/depression\par \par \par ***UPDATE:12/20/19***\par Ms Rama Victoria is doing well with no reported interval seizures. She has no side effects . She needs to be followed by psychiatry for her anxiety/depression\par \par Lamotrigine 100mg BID\par Zoloft 100mg QHS\par Xanax 2mg TID\par \par \par The patient states that  seizures at the age of 6 months and a second seizure at the age of 1 years (the exact details of the seizure are unknown) that were in the presence of high fevers.  The workup is unknown and the patient was not treated with medications at that time.\par \par The patient had her first adult seizure at the age of 19.  She was living with an Ex-boyfriend at that time and awoke in the middle of the night foaming at her mouth and having full body stiffness.  This was followed by nearly an hour of confusion.  She was taken to the local ED where full workup was negative for any pathology (full details unknown).  \par \par Since her first event, she has had similar events every month to few months (although she has been known to have clusters of 2 seizures in a week rarely).  The episodes are described as full body stiffening without any focality.  They last 30 seconds to three minutes and have accompanying tongue biting (usually left posterior lateral tongue) and are followed by a post-ictal state lasting from a half-hour to  a full day.  They can occur during both day and nighttime hours and are more frequent with sleep deprivation. \par \par The patient was originally prescribed Topamax that was stopped because it did not control the events.  She does not know the maximum dose.  The patient was prescribed Keppra in the past, but this was stopped secondary to agitation and aggression.  Also made her feel "psychotic" and she had events even when on a good dose of the medication.  Depakote was prescribed in May 2013, but it made her feel sick and was stopped. 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  Started on ZNS and LTG.  ZNS gave her severe depression and was stopped.  Was on LTG 200mg bid after slow increase.  \par \par We started LTG 100mg bid (after increase) and mood better (weaned herself off beginning 2017).   Also on Zoloft 75mg qhs.  Still very depressed and has no psychiatrist or .  \par \par PMHx: As above\par PSHx: none\par SocHx: smokes 1/2PPD, social etoh, smoke MJ, not working at this time, not driving\par FHx: grandfather with epilepsy (does not know details)\par Meds: none\par All: ?NSAIDs

## 2022-11-29 ENCOUNTER — RX RENEWAL (OUTPATIENT)
Age: 32
End: 2022-11-29

## 2023-03-28 ENCOUNTER — RX RENEWAL (OUTPATIENT)
Age: 33
End: 2023-03-28

## 2023-04-06 ENCOUNTER — APPOINTMENT (OUTPATIENT)
Dept: DERMATOLOGY | Facility: CLINIC | Age: 33
End: 2023-04-06

## 2023-04-27 ENCOUNTER — APPOINTMENT (OUTPATIENT)
Dept: DERMATOLOGY | Facility: CLINIC | Age: 33
End: 2023-04-27
Payer: MEDICAID

## 2023-04-27 DIAGNOSIS — L70.9 ACNE, UNSPECIFIED: ICD-10-CM

## 2023-04-27 PROCEDURE — 99214 OFFICE O/P EST MOD 30 MIN: CPT

## 2023-04-27 RX ORDER — TRETINOIN 0.5 MG/G
0.05 CREAM TOPICAL
Qty: 1 | Refills: 5 | Status: ACTIVE | COMMUNITY
Start: 2021-05-10 | End: 1900-01-01

## 2023-04-27 NOTE — HISTORY OF PRESENT ILLNESS
[de-identified] : Acne;  still having outbreaks recently;  \par using;  sprirono 100/d;  OTC preps; \par

## 2023-04-27 NOTE — ASSESSMENT
[FreeTextEntry1] : Acne;  still requires tx, periodic outbreaks\par \par Therapeutic options and their risks and benefits; along with multiple diagnostic possibilities were discussed at length; risks and benefits of further study were discussed;\par \par continue spirono to 100/d;  tolerating well; \par \par cont tretinoin 0.05 HS;     GoodRx coupon given; \par \par f/u q 6-12 mos;  may spend winter in [place]

## 2023-07-19 ENCOUNTER — NON-APPOINTMENT (OUTPATIENT)
Age: 33
End: 2023-07-19

## 2023-11-29 RX ORDER — SPIRONOLACTONE 100 MG/1
100 TABLET ORAL
Qty: 90 | Refills: 3 | Status: ACTIVE | COMMUNITY
Start: 2021-05-10 | End: 1900-01-01